# Patient Record
Sex: FEMALE | Race: WHITE | NOT HISPANIC OR LATINO | Employment: FULL TIME | URBAN - METROPOLITAN AREA
[De-identification: names, ages, dates, MRNs, and addresses within clinical notes are randomized per-mention and may not be internally consistent; named-entity substitution may affect disease eponyms.]

---
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
## 2024-01-04 PROCEDURE — 99285 EMERGENCY DEPT VISIT HI MDM: CPT | Mod: 25

## 2024-01-05 ENCOUNTER — APPOINTMENT (OUTPATIENT)
Dept: CT IMAGING | Facility: CLINIC | Age: 19
End: 2024-01-05
Attending: EMERGENCY MEDICINE
Payer: COMMERCIAL

## 2024-01-05 ENCOUNTER — HOSPITAL ENCOUNTER (EMERGENCY)
Facility: CLINIC | Age: 19
Discharge: HOME | End: 2024-01-05
Attending: EMERGENCY MEDICINE | Admitting: EMERGENCY MEDICINE
Payer: COMMERCIAL

## 2024-01-05 VITALS
RESPIRATION RATE: 16 BRPM | SYSTOLIC BLOOD PRESSURE: 98 MMHG | DIASTOLIC BLOOD PRESSURE: 57 MMHG | OXYGEN SATURATION: 99 % | TEMPERATURE: 98.7 F | HEART RATE: 68 BPM | HEIGHT: 63 IN | WEIGHT: 125 LBS | BODY MASS INDEX: 22.15 KG/M2

## 2024-01-05 DIAGNOSIS — R51.9 ACUTE NONINTRACTABLE HEADACHE, UNSPECIFIED HEADACHE TYPE: ICD-10-CM

## 2024-01-05 LAB
ANION GAP SERPL CALCULATED.3IONS-SCNC: 8 MMOL/L (ref 7–15)
BASOPHILS # BLD AUTO: 0 10E3/UL (ref 0–0.2)
BASOPHILS NFR BLD AUTO: 0 %
BUN SERPL-MCNC: 7 MG/DL (ref 6–20)
CALCIUM SERPL-MCNC: 9.5 MG/DL (ref 8.6–10)
CHLORIDE SERPL-SCNC: 105 MMOL/L (ref 98–107)
CREAT SERPL-MCNC: 0.79 MG/DL (ref 0.51–0.95)
DEPRECATED HCO3 PLAS-SCNC: 27 MMOL/L (ref 22–29)
EGFRCR SERPLBLD CKD-EPI 2021: >90 ML/MIN/1.73M2
EOSINOPHIL # BLD AUTO: 0 10E3/UL (ref 0–0.7)
EOSINOPHIL NFR BLD AUTO: 0 %
ERYTHROCYTE [DISTWIDTH] IN BLOOD BY AUTOMATED COUNT: 14.5 % (ref 10–15)
FLUAV RNA SPEC QL NAA+PROBE: NEGATIVE
FLUBV RNA RESP QL NAA+PROBE: NEGATIVE
GLUCOSE SERPL-MCNC: 93 MG/DL (ref 70–99)
HCG SERPL QL: NEGATIVE
HCT VFR BLD AUTO: 39.3 % (ref 35–47)
HGB BLD-MCNC: 12.7 G/DL (ref 11.7–15.7)
HOLD SPECIMEN: NORMAL
IMM GRANULOCYTES # BLD: 0 10E3/UL
IMM GRANULOCYTES NFR BLD: 0 %
LYMPHOCYTES # BLD AUTO: 2.5 10E3/UL (ref 0.8–5.3)
LYMPHOCYTES NFR BLD AUTO: 25 %
MCH RBC QN AUTO: 24.7 PG (ref 26.5–33)
MCHC RBC AUTO-ENTMCNC: 32.3 G/DL (ref 31.5–36.5)
MCV RBC AUTO: 77 FL (ref 78–100)
MONOCYTES # BLD AUTO: 0.8 10E3/UL (ref 0–1.3)
MONOCYTES NFR BLD AUTO: 8 %
NEUTROPHILS # BLD AUTO: 6.7 10E3/UL (ref 1.6–8.3)
NEUTROPHILS NFR BLD AUTO: 67 %
NRBC # BLD AUTO: 0 10E3/UL
NRBC BLD AUTO-RTO: 0 /100
PLATELET # BLD AUTO: 281 10E3/UL (ref 150–450)
POTASSIUM SERPL-SCNC: 3.9 MMOL/L (ref 3.4–5.3)
RBC # BLD AUTO: 5.14 10E6/UL (ref 3.8–5.2)
RSV RNA SPEC NAA+PROBE: NEGATIVE
SARS-COV-2 RNA RESP QL NAA+PROBE: NEGATIVE
SODIUM SERPL-SCNC: 140 MMOL/L (ref 135–145)
WBC # BLD AUTO: 10.1 10E3/UL (ref 4–11)

## 2024-01-05 PROCEDURE — 250N000011 HC RX IP 250 OP 636: Performed by: EMERGENCY MEDICINE

## 2024-01-05 PROCEDURE — 96374 THER/PROPH/DIAG INJ IV PUSH: CPT | Mod: 59

## 2024-01-05 PROCEDURE — 70496 CT ANGIOGRAPHY HEAD: CPT

## 2024-01-05 PROCEDURE — 87637 SARSCOV2&INF A&B&RSV AMP PRB: CPT | Performed by: EMERGENCY MEDICINE

## 2024-01-05 PROCEDURE — 96361 HYDRATE IV INFUSION ADD-ON: CPT

## 2024-01-05 PROCEDURE — 999N000104 CT CERVICAL SPINE RECONSTRUCTED

## 2024-01-05 PROCEDURE — 84703 CHORIONIC GONADOTROPIN ASSAY: CPT | Performed by: EMERGENCY MEDICINE

## 2024-01-05 PROCEDURE — 85025 COMPLETE CBC W/AUTO DIFF WBC: CPT | Performed by: EMERGENCY MEDICINE

## 2024-01-05 PROCEDURE — 96375 TX/PRO/DX INJ NEW DRUG ADDON: CPT | Mod: 59

## 2024-01-05 PROCEDURE — 80048 BASIC METABOLIC PNL TOTAL CA: CPT | Performed by: EMERGENCY MEDICINE

## 2024-01-05 PROCEDURE — 258N000003 HC RX IP 258 OP 636: Performed by: EMERGENCY MEDICINE

## 2024-01-05 PROCEDURE — 36415 COLL VENOUS BLD VENIPUNCTURE: CPT | Performed by: EMERGENCY MEDICINE

## 2024-01-05 RX ORDER — METOCLOPRAMIDE HYDROCHLORIDE 5 MG/ML
10 INJECTION INTRAMUSCULAR; INTRAVENOUS ONCE
Status: COMPLETED | OUTPATIENT
Start: 2024-01-05 | End: 2024-01-05

## 2024-01-05 RX ORDER — DIPHENHYDRAMINE HYDROCHLORIDE 50 MG/ML
50 INJECTION INTRAMUSCULAR; INTRAVENOUS ONCE
Status: COMPLETED | OUTPATIENT
Start: 2024-01-05 | End: 2024-01-05

## 2024-01-05 RX ORDER — OXYCODONE HYDROCHLORIDE 5 MG/1
5 TABLET ORAL ONCE
Status: DISCONTINUED | OUTPATIENT
Start: 2024-01-05 | End: 2024-01-05

## 2024-01-05 RX ORDER — ACETAMINOPHEN 325 MG/1
650 TABLET ORAL ONCE
Status: COMPLETED | OUTPATIENT
Start: 2024-01-05 | End: 2024-01-05

## 2024-01-05 RX ORDER — KETOROLAC TROMETHAMINE 15 MG/ML
15 INJECTION, SOLUTION INTRAMUSCULAR; INTRAVENOUS ONCE
Status: COMPLETED | OUTPATIENT
Start: 2024-01-05 | End: 2024-01-05

## 2024-01-05 RX ORDER — DEXAMETHASONE SODIUM PHOSPHATE 4 MG/ML
4 INJECTION, SOLUTION INTRA-ARTICULAR; INTRALESIONAL; INTRAMUSCULAR; INTRAVENOUS; SOFT TISSUE ONCE
Status: COMPLETED | OUTPATIENT
Start: 2024-01-05 | End: 2024-01-05

## 2024-01-05 RX ORDER — ONDANSETRON 4 MG/1
4 TABLET, ORALLY DISINTEGRATING ORAL EVERY 8 HOURS PRN
Qty: 10 TABLET | Refills: 0 | Status: SHIPPED | OUTPATIENT
Start: 2024-01-05

## 2024-01-05 RX ORDER — IOPAMIDOL 755 MG/ML
75 INJECTION, SOLUTION INTRAVASCULAR ONCE
Status: COMPLETED | OUTPATIENT
Start: 2024-01-05 | End: 2024-01-05

## 2024-01-05 RX ADMIN — KETOROLAC TROMETHAMINE 15 MG: 15 INJECTION, SOLUTION INTRAMUSCULAR; INTRAVENOUS at 03:35

## 2024-01-05 RX ADMIN — METOCLOPRAMIDE HYDROCHLORIDE 10 MG: 5 INJECTION INTRAMUSCULAR; INTRAVENOUS at 01:49

## 2024-01-05 RX ADMIN — SODIUM CHLORIDE 1000 ML: 9 INJECTION, SOLUTION INTRAVENOUS at 01:45

## 2024-01-05 RX ADMIN — DIPHENHYDRAMINE HYDROCHLORIDE 50 MG: 50 INJECTION, SOLUTION INTRAMUSCULAR; INTRAVENOUS at 01:53

## 2024-01-05 RX ADMIN — IOPAMIDOL 75 ML: 755 INJECTION, SOLUTION INTRAVENOUS at 02:38

## 2024-01-05 ASSESSMENT — ENCOUNTER SYMPTOMS
DIARRHEA: 0
NAUSEA: 1
HEADACHES: 1
ABDOMINAL PAIN: 0
FEVER: 0
SHORTNESS OF BREATH: 0
NECK PAIN: 0
COUGH: 0
VOMITING: 1
DYSURIA: 0
BACK PAIN: 0

## 2024-01-05 ASSESSMENT — ACTIVITIES OF DAILY LIVING (ADL): ADLS_ACUITY_SCORE: 35

## 2024-01-05 NOTE — ED PROVIDER NOTES
EMERGENCY DEPARTMENT ENCOUNTER      NAME: Keiko Boston  AGE: 18 year old female  YOB: 2005  MRN: [id number]  EVALUATION DATE & TIME: No admission date for patient encounter.    PCP: Meera Hollis    ED PROVIDER: Lilia Sykes M.D.        Chief Complaint   Patient presents with    Headache    Nausea         FINAL IMPRESSION:    1. Acute nonintractable headache, unspecified headache type            MEDICAL DECISION MAKIN year old female with history of POTS, anxiety, and ADHD who presents emergency department with 2 weeks of headache.  She did have a syncopal episode related to her POTS about 2 weeks ago and was worried this could be related.  CT imaging unremarkable.  Laboratories reassuring.  She did have improvement in symptoms while in the ER.  She did appear sensitive to Reglan and did develop some anxiety that improved with Benadryl.  Overall does not appear toxic or septic.  No indication for lumbar puncture.  No concerns for things like stroke, hypertensive urgency or emergency, dissection, or other intracranial abnormalities.  At this time I feel she is appropriate for discharge home.  Patient feels better and feels like she is ready to go home.  She will be discharged home with her mother.      ED COURSE:  12:01 AM  I met with the patient to gather history and perform my exam. ED course and treatment discussed.    3:47 AM  Pt had anxiety after the reglan and then dipped her sats briefly with the benadryl. She is resovled now and off oxygen. She looks well overall and no distress. No neck rigidity or signs of meningismus.  I have written for her to get some Toradol at [facility].  Mother is a little nervous about the oxycodone which I can respect and nurses can hold it at this time and just try the Toradol.  I have updated them on all results.  Mother was pretty concerned about the reaction she had to the Reglan and Benadryl, but at this time I think it is more side effects  and her baseline anxiety.    4:53 AM  Pt is feeling better and feels ready to go home.  Will discharge her home at this time.  Prescription for Zofran will be provided.  She does not appear toxic or septic.  No red flags regards to her headache.  Do not think she requires lumbar puncture.  They feel comfortable the plan for discharge and all of their questions have been answered.    At this time I do not think that this represents CVA, TIA, SAH, glaucoma, temporal arteritis, sinus thrombosis, vascular dissection, pseudotumor cerebri, meningitis, enchephalitis, hypertensive urgency or emergency, or other such etiologies.    At the conclusion of the encounter I discussed the results of all of the tests and the disposition. Their questions were answered. The patient (and any family present) acknowledged understanding and were agreeable with the care plan.      CONSULTANTS:  none        MEDICATIONS GIVEN IN THE EMERGENCY:  Medications   oxyCODONE (ROXICODONE) tablet 5 mg (has no administration in time range)   metoclopramide (REGLAN) injection 10 mg (10 mg Intravenous $Given 1/5/24 0149)   diphenhydrAMINE (BENADRYL) injection 50 mg (50 mg Intravenous $Given 1/5/24 0153)   dexAMETHasone (DECADRON) injection 4 mg (4 mg Intravenous Not Given 1/5/24 0326)   acetaminophen (TYLENOL) tablet 650 mg (650 mg Oral Not Given 1/5/24 0154)   sodium chloride 0.9% BOLUS 1,000 mL (1,000 mLs Intravenous $New Bag 1/5/24 0145)   iopamidol (ISOVUE-370) solution 75 mL (75 mLs Intravenous $Given 1/5/24 0238)   ketorolac (TORADOL) injection 15 mg (15 mg Intravenous $Given 1/5/24 0335)           NEW PRESCRIPTIONS STARTED AT TODAY'S ER VISIT     Medication List        Started      ondansetron 4 MG ODT tab  Commonly known as: ZOFRAN ODT  4 mg, Oral, EVERY 8 HOURS PRN                  CONDITION:  stable        DISPOSITION:  D.c home with mother    "      =================================================================  =================================================================  TRIAGE ASSESSMENT:  Pt presents with complaints of ha for two days. Pain is all over the head. Pt vomited roughly 20 min PTA. Denies hx migraines, no injuries or falls. Pt does have hx of POTS.       ED Triage Vitals [01/04/24 2326]   Enc Vitals Group      /77      Pulse 107      Resp 16      Temp 98.7  F (37.1  C)      Temp src Oral      SpO2 98 %      Weight 56.7 kg (125 lb)      Height 1.6 m (5' 3\")          ================================================================  ================================================================    HPI    Patient information was obtained from: patient and mother    Use of Intrepreter: N/A      Keiko MARK Darvin is a 18 year old female with history of POTS, ADHD, controlled substance agreement, prior concussions who presents to the ER with complaints of headache.    She reports 2 weeks ago she had a syncopal episode related to her POTS.  Now she reports she has had a headache for 2 days.  She states is quite diffuse but mainly in the occipital region.  She denies any actual neck pain but states that it makes her headache worse when she tilts her head.  She is able to turn it from side-to-side without any difficulty or worsening of her pain.  Otherwise denies fevers, cough, chest pain, shortness of breath, abdominal pain, diarrhea.  She states she has had a few episodes of vomiting due to the pain.    CHART REVIEW:  Chart reviews shows that she has a controlled substance agreement, but looking through the chart it looks like this is related to her ADHD med, Adderall.      REVIEW OF SYSTEMS  Review of Systems   Constitutional:  Negative for fever.   Respiratory:  Negative for cough and shortness of breath.    Cardiovascular:  Negative for chest pain.   Gastrointestinal:  Positive for nausea and vomiting. Negative for abdominal pain " "and diarrhea.   Genitourinary:  Negative for dysuria.   Musculoskeletal:  Negative for back pain and neck pain.   Neurological:  Positive for headaches.   All other systems reviewed and are negative.        PAST MEDICAL HISTORY:  Past Medical History:   Diagnosis Date    ADHD     POTS (postural orthostatic tachycardia syndrome)     Syncope and collapse          PAST SURGICAL HISTORY:  No past surgical history on file.      CURRENT MEDICATIONS:    Prior to Admission medications    Medication Sig Start Date End Date Taking? Authorizing Provider   amphetamine-dextroamphetamine (ADDERALL) 10 MG tablet Take 1 tablet (10 mg) by mouth daily (with lunch) 8/16/23   Mc Ruiz NP   amphetamine-dextroamphetamine (ADDERALL) 20 MG tablet Take 1 tablet (20 mg) by mouth daily (with breakfast) 8/16/23   Mc Ruiz NP         ALLERGIES:  No Known Allergies      FAMILY HISTORY:  Family History   Problem Relation Age of Onset    Migraines Mother          SOCIAL HISTORY:  Social History     Socioeconomic History    Marital status: Single   Tobacco Use    Smoking status: Never    Smokeless tobacco: Never    Tobacco comments:     no exp   Vaping Use    Vaping Use: Never used   Substance and Sexual Activity    Alcohol use: No    Drug use: No         VITALS:  Patient Vitals for the past 24 hrs:   BP Temp Temp src Pulse Resp SpO2 Height Weight   01/05/24 0240 121/58 -- -- 97 -- 97 % -- --   01/05/24 0210 119/82 -- -- (!) 126 -- 100 % -- --   01/04/24 2326 125/77 98.7  F (37.1  C) Oral 107 16 98 % 1.6 m (5' 3\") 56.7 kg (125 lb)       Wt Readings from Last 3 Encounters:   01/04/24 56.7 kg (125 lb) (45%, Z= -0.14)*   03/30/23 57.6 kg (127 lb) (52%, Z= 0.05)*   10/12/22 59.6 kg (131 lb 8 oz) (62%, Z= 0.31)*     * Growth percentiles are based on CDC (Girls, 2-20 Years) data.       Estimated Creatinine Clearance: 102.5 mL/min (based on SCr of 0.79 mg/dL).    PHYSICAL EXAM    Constitutional:  Well developed, Well nourished, NAD, GCS " 15  HENT:  Normocephalic, Atraumatic, Bilateral external ears normal,  Nose normal. Neck-  No stridor.   Eyes:  PERRL, EOMI, Conjunctiva normal, No discharge.  Respiratory:  Normal breath sounds, No respiratory distress, No wheezing, Speaks full sentences easily. No cough.   Cardiovascular:  Normal heart rate, Regular rhythm, No rubs, No gallops. Chest wall nontender.   GI:  No excessive obesity.  Bowel sounds normal, Soft, No tenderness, No masses, No flank tenderness. No rebound or guarding.   : deferred  Musculoskeletal:  No cyanosis, No clubbing. Good range of motion in all major joints. No tenderness to palpation or major deformities noted. Pt hold her head still when walking, but can turn it side to side without discomfort of limitation but reports HA worsens when she lies down or when she tilts her head.  Integument:  Warm, Dry, No erythema, No rash.  No petechiae.   Neurologic:  Alert & oriented x 3, Normal motor function, Normal sensory function, No focal deficits noted. Normal gait.   Psychiatric:  Affect normal, Cooperative         LAB:  All pertinent labs reviewed and interpreted.  Recent Results (from the past 24 hour(s))   HCG QUALitative pregnancy (blood)    Collection Time: 01/05/24  1:25 AM   Result Value Ref Range    hCG Serum Qualitative Negative Negative   Basic metabolic panel    Collection Time: 01/05/24  1:25 AM   Result Value Ref Range    Sodium 140 135 - 145 mmol/L    Potassium 3.9 3.4 - 5.3 mmol/L    Chloride 105 98 - 107 mmol/L    Carbon Dioxide (CO2) 27 22 - 29 mmol/L    Anion Gap 8 7 - 15 mmol/L    Urea Nitrogen 7.0 6.0 - 20.0 mg/dL    Creatinine 0.79 0.51 - 0.95 mg/dL    GFR Estimate >90 >60 mL/min/1.73m2    Calcium 9.5 8.6 - 10.0 mg/dL    Glucose 93 70 - 99 mg/dL   Extra Blue Top Tube    Collection Time: 01/05/24  1:25 AM   Result Value Ref Range    Hold Specimen JIC    CBC with platelets and differential    Collection Time: 01/05/24  1:25 AM   Result Value Ref Range    WBC Count  "10.1 4.0 - 11.0 10e3/uL    RBC Count 5.14 3.80 - 5.20 10e6/uL    Hemoglobin 12.7 11.7 - 15.7 g/dL    Hematocrit 39.3 35.0 - 47.0 %    MCV 77 (L) 78 - 100 fL    MCH 24.7 (L) 26.5 - 33.0 pg    MCHC 32.3 31.5 - 36.5 g/dL    RDW 14.5 10.0 - 15.0 %    Platelet Count 281 150 - 450 10e3/uL    % Neutrophils 67 %    % Lymphocytes 25 %    % Monocytes 8 %    % Eosinophils 0 %    % Basophils 0 %    % Immature Granulocytes 0 %    NRBCs per 100 WBC 0 <1 /100    Absolute Neutrophils 6.7 1.6 - 8.3 10e3/uL    Absolute Lymphocytes 2.5 0.8 - 5.3 10e3/uL    Absolute Monocytes 0.8 0.0 - 1.3 10e3/uL    Absolute Eosinophils 0.0 0.0 - 0.7 10e3/uL    Absolute Basophils 0.0 0.0 - 0.2 10e3/uL    Absolute Immature Granulocytes 0.0 <=0.4 10e3/uL    Absolute NRBCs 0.0 10e3/uL   Symptomatic Influenza A/B, RSV, & SARS-CoV2 PCR (COVID-19) Nasopharyngeal    Collection Time: 01/05/24  1:26 AM    Specimen: Nasopharyngeal; Swab   Result Value Ref Range    Influenza A PCR Negative Negative    Influenza B PCR Negative Negative    RSV PCR Negative Negative    SARS CoV2 PCR Negative Negative       No results found for: \"ABORH\"        RADIOLOGY:  Reviewed all pertinent imaging. Please see official radiology report.    CT Cervical Spine Reconstructed   Final Result   IMPRESSION:    HEAD CT:   1.  No acute intracranial process.      HEAD CTA:    1.  Normal CTA [place].      NECK CTA:   1.  Normal neck CTA.      CERVICAL SPINE CT:   1.  No acute cervical spine fracture.      CTA Head Neck with Contrast   Final Result   IMPRESSION:    HEAD CT:   1.  No acute intracranial process.      HEAD CTA:    1.  Normal CTA [place].      NECK CTA:   1.  Normal neck CTA.      CERVICAL SPINE CT:   1.  No acute cervical spine fracture.            EKG:    none      PROCEDURES:  none    Medical Decision Making  Obtained supplemental history:Supplemental history obtained?: Documented in chart and Family Member/Significant Other  Reviewed external records: " External records reviewed?: No  Care impacted by chronic illness:Other: POTS, syncope  Care significantly affected by social determinants of health:Access to Medical Care  Did you consider but not order tests?: Work up considered but not performed and documented in chart, if applicable  Did you interpret images independently?: Independent interpretation of ECG and images noted in documentation, when applicable.  Consultation discussion with other provider:Did you involve another provider (consultant, , pharmacy, etc.)?: No  Discharge. I prescribed additional prescription strength medication(s) as charted. I considered admission, but ultimately discharged patient imaging reassuring, laboratories reassuring, and patient feeling better..      Lilia Sykes M.D. [facility]  Emergency Medicine and Medical Toxicology  [facility]ly [facility] EMERGENCY ROOM  [address] [address] [phone]  Dept: [phone]           Lilia Sykes MD  01/05/24 3361

## 2024-01-05 NOTE — Clinical Note
Keiok Boston was seen and treated in our emergency department on 1/4/2024.  She may return to work on 01/06/2024.       If you have any questions or concerns, please don't hesitate to call.      Lilia Sykes MD

## 2024-01-05 NOTE — ED NOTES
Shortly after patient received Reglan and Benadryl IV, patient became acutely agitated, shaking and dry heaving. Patient stated her head was pounding. Patient than calmed down and was able to relax.

## 2024-01-05 NOTE — ED TRIAGE NOTES
Pt presents with complaints of ha for two days. Pain is all over the head. Pt vomited roughly 20 min PTA. Denies hx migraines, no injuries or falls. Pt does have hx of POTS.

## 2024-01-05 NOTE — DISCHARGE INSTRUCTIONS
The CT scan is normal. No concerning findings.    I have prescribed some nausea medicine for you to use as needed.    Try to stay hydrated. Sometimes caffeine can help headaches and you can try some a caffeine drink or two tomorrow if needed.    You can take tylenol 500mg every 6 hours and ibuprofen 400 every 6 hours as needed for headache.    Make an appointment to see your PCP for Monday if not improving.    Return to emergency department with worsening headache, fever, concerns for dehydration, or any other concerns.    Thank you for choosing [facility] Emergency Department.  It has been my pleasure caring for you today.     ~Dr. Onel MD

## 2024-09-14 ENCOUNTER — HEALTH MAINTENANCE LETTER (OUTPATIENT)
Age: 19
End: 2024-09-14

## 2025-02-04 ENCOUNTER — NURSE TRIAGE (OUTPATIENT)
Dept: NURSING | Facility: CLINIC | Age: 20
End: 2025-02-04
Payer: COMMERCIAL

## 2025-02-04 NOTE — TELEPHONE ENCOUNTER
"      Nurse Triage SBAR    Is this a 2nd Level Triage? NO    Situation:  vomiting    Background: Call from patient with concerns regarding coming in to get an IV. Patient states that she has vomited all oral intake for over 24 hours. She reports that she did not urinate yesterday however, she did have diarrhea. Patient also reports that she has had diarrhea 2x. Patient state that there are black specks in her vomit that appear to look like coffee grounds and it has occurred more than 1x. Patient also has hx of POTS and feels as though sx are causing this condition to flare up     Assessment: Call from patient with concerns regarding coming in to get an IV. Patient states that she has vomited all oral intake for over 24 hours. She reports that she did not urinate yesterday however, she did have diarrhea. Patient also reports that she has had diarrhea 2x. Patient state that there are black specks in her vomit that appear to look like coffee grounds and it has occurred more than 1x. Patient also has hx of POTS and feels as though sx are causing this condition to flare up     Protocol Recommended Disposition:   Go to ED Now    Recommendation:  care advice given          Does the patient meet one of the following criteria for ADS visit consideration? 16+ years old, with an [facility] PCP     TIP  Providers, please consider if this condition is appropriate for management at one of our Acute and Diagnostic Services sites.     If patient is a good candidate, please use dotphrase <dot>triageresponse and select Refer to ADS to document.      Reason for Disposition   [1] Vomiting AND [2] contains red blood or black (\"coffee ground\") material  (Exception: Few red streaks in vomit that only happened once.)    Additional Information   Negative: Shock suspected (e.g., cold/pale/clammy skin, too weak to stand, low BP, rapid pulse)   Negative: Difficult to awaken or acting confused (e.g., disoriented, slurred speech)   Negative: Sounds " like a life-threatening emergency to the triager   Negative: Vomiting occurs only while coughing   Negative: [1] Pregnant < 20 Weeks AND [2] nausea/vomiting began in early pregnancy (i.e., 4-8 weeks pregnant)   Negative: Chest pain   Negative: Headache is main symptom   Negative: Vomiting (or Nausea) in a cancer patient who is currently (or recently) receiving chemotherapy or radiation therapy, or cancer patient who has metastatic or end-stage cancer and is receiving palliative care    Protocols used: Vomiting-A-AH